# Patient Record
Sex: FEMALE | Race: WHITE | NOT HISPANIC OR LATINO | Employment: OTHER | URBAN - METROPOLITAN AREA
[De-identification: names, ages, dates, MRNs, and addresses within clinical notes are randomized per-mention and may not be internally consistent; named-entity substitution may affect disease eponyms.]

---

## 2022-09-26 ENCOUNTER — TRANSFERRED RECORDS (OUTPATIENT)
Dept: HEALTH INFORMATION MANAGEMENT | Facility: CLINIC | Age: 82
End: 2022-09-26

## 2022-09-26 ENCOUNTER — MEDICAL CORRESPONDENCE (OUTPATIENT)
Dept: HEALTH INFORMATION MANAGEMENT | Facility: CLINIC | Age: 82
End: 2022-09-26

## 2022-09-27 ENCOUNTER — TRANSCRIBE ORDERS (OUTPATIENT)
Dept: OTHER | Age: 82
End: 2022-09-27

## 2022-09-27 DIAGNOSIS — C50.911 MALIGNANT NEOPLASM OF RIGHT BREAST (H): Primary | ICD-10-CM

## 2022-10-20 NOTE — PROGRESS NOTES
MEDICAL RECORDS REQUEST   Radiation Oncology  [address] [place] [address]  PHONE: [phone]  Fax: [phone]        FUTURE VISIT INFORMATION                                                   Jayshree M Michael, : 1940 scheduled for future visit at [facility] Radiation Oncology    APPOINTMENT INFORMATION:    Date: 10/25/2022    Provider:  Dr. Clark     Reason for Visit/Diagnosis: Right breast cancer    REFERRAL INFORMATION:    Referring provider:  Kimberlee Hoang MD    RECORDS REQUESTED FOR VISIT                                                     Action    Action Taken 10/20/2022 3:20pm KEB   I called [facility] Dept PH: [phone] - their phone line is busy.. will call again later.     I called pt Jayshree     10/24/2022 3:03pm KEMASOUD   I called [facility] Dept - they will push ALL breast imaging on pt to [place] P[facility]    I called our internal file room to have breast imaging resolved [phone]    10/25/2022 8:13AM KEB   I called our internal file room again- none of the breast imaging has been resolved yet PH: [phone]- [place] will help resolve the images. He is aware of the appt this afternoon.      SOFT TISSUE & BREAST     CT, MRI, PET/CT AND REPORTS in process- Requested IMG ([facility])    ANY RECENT LABS yes   SURGICAL REPORTS yes     2022 [facility] Biopsy Report in Epic/CE  A) RIGHT BREAST, MASTECTOMY:   1. Invasive ductal carcinoma    2022   A) RIGHT BREAST, 10:00, 5 CM FROM NIPPLE, STEREOTACTIC-GUIDED CORE BIOPSY:   1. Invasive ductal carcinoma    PATHOLOGY REPORTS yes   *Send all radiation Prior radiation records for both [facility] and [facility] Radiation Oncology patients to [facility] with  ATTN: PATRICK/Yung Dosi/Physics

## 2022-10-25 ENCOUNTER — OFFICE VISIT (OUTPATIENT)
Dept: RADIATION ONCOLOGY | Facility: CLINIC | Age: 82
End: 2022-10-25
Attending: SURGERY
Payer: MEDICARE

## 2022-10-25 ENCOUNTER — PRE VISIT (OUTPATIENT)
Dept: RADIATION ONCOLOGY | Facility: CLINIC | Age: 82
End: 2022-10-25

## 2022-10-25 VITALS
SYSTOLIC BLOOD PRESSURE: 99 MMHG | RESPIRATION RATE: 16 BRPM | OXYGEN SATURATION: 98 % | DIASTOLIC BLOOD PRESSURE: 64 MMHG | WEIGHT: 121.5 LBS | HEART RATE: 91 BPM

## 2022-10-25 DIAGNOSIS — Z17.0 MALIGNANT NEOPLASM OF UPPER-OUTER QUADRANT OF RIGHT BREAST IN FEMALE, ESTROGEN RECEPTOR POSITIVE (H): Primary | ICD-10-CM

## 2022-10-25 DIAGNOSIS — C50.411 MALIGNANT NEOPLASM OF UPPER-OUTER QUADRANT OF RIGHT BREAST IN FEMALE, ESTROGEN RECEPTOR POSITIVE (H): Primary | ICD-10-CM

## 2022-10-25 PROBLEM — E78.5 HYPERLIPIDEMIA: Status: ACTIVE | Noted: 2017-06-29

## 2022-10-25 PROBLEM — C44.91 BASAL CELL CARCINOMA (BCC): Status: ACTIVE | Noted: 2018-06-01

## 2022-10-25 PROBLEM — Z71.89 ADVANCE CARE PLANNING: Status: ACTIVE | Noted: 2022-06-07

## 2022-10-25 PROBLEM — G89.4 CHRONIC PAIN DISORDER: Status: ACTIVE | Noted: 2019-10-14

## 2022-10-25 PROBLEM — C50.919 BREAST CANCER (H): Status: ACTIVE | Noted: 2022-10-25

## 2022-10-25 PROBLEM — R41.89 IMPAIRED COGNITION: Status: ACTIVE | Noted: 2022-06-07

## 2022-10-25 PROBLEM — M51.369 DDD (DEGENERATIVE DISC DISEASE), LUMBAR: Status: ACTIVE | Noted: 2019-10-14

## 2022-10-25 PROBLEM — M54.17 LUMBOSACRAL RADICULITIS: Status: ACTIVE | Noted: 2022-10-25

## 2022-10-25 PROCEDURE — 99205 OFFICE O/P NEW HI 60 MIN: CPT | Performed by: RADIOLOGY

## 2022-10-25 RX ORDER — FLUTICASONE PROPIONATE 50 MCG
1 SPRAY, SUSPENSION (ML) NASAL DAILY
COMMUNITY

## 2022-10-25 RX ORDER — GABAPENTIN 300 MG/1
300 CAPSULE ORAL AT BEDTIME
COMMUNITY
Start: 2022-10-04

## 2022-10-25 RX ORDER — LETROZOLE 2.5 MG/1
TABLET, FILM COATED ORAL
COMMUNITY
Start: 2022-10-17

## 2022-10-25 RX ORDER — MELOXICAM 15 MG/1
TABLET ORAL PRN
COMMUNITY
Start: 2022-10-04 | End: 2022-11-09

## 2022-10-25 RX ORDER — ROSUVASTATIN CALCIUM 5 MG/1
TABLET, COATED ORAL
COMMUNITY
Start: 2021-09-15

## 2022-10-25 NOTE — PROGRESS NOTES
Dear Colleagues,  Today Jayshree Rodriguez was seen in consultation.  IDENTIFICATION: This is a 82 year old woman with right (UOQ) breast cancer, status post mastectomy and SLNBx, revealing G2 IDCA w/ G3 DCIS, [id number] ER+/MO+/H2N- disease referred for adjuvant radiation therapy. She will not be receiving chemotherapy.  HISTORY OF PRESENT ILLNESS: Jayshree Rodriguez was in her usual state of health this past year.   On August 1, 2022 bilateral screening mammogram showed within the right upper outer breast a small mass.  Negative findings in the left breast.  On August 11, 2022 right diagnostic mammogram showed the right breast asymmetry with no sonographic correlate and an intramammary lymph node at the 10:00 position.  Stereotactic biopsy was completed on August 19, 2022 showing grade 2 IDC with grade 2 DCIS, ER/MO positive HER2/yolie negative..  On August 31, 2022 breast MRI showed the right upper outer breast mass measuring 2.7 cm in greatest dimension with associated enhancement from the mass to the base of the nipple.  A hematoma was also present.  The left breast mass was unremarkable.  There was also a 1.5 cm lymph node in the right axilla with a thickened cortex.  No morphologically abnormal lymph nodes on the left.  On September 21, 2021, Dr. Kimberlee Hoang took her to the OR for right breast mastectomy and sentinel lymph node biopsy.  Pathology found 5.5 cm of grade 2 IDC with involvement of the nipple dermis.  There was positive LVSI and positive grade 3 DCIS.  There was an involved intramammary lymph node that measured 6 mm. There was no VANESSA identified in this node.  There was also 1 out of 3 involved right axillary lymph nodes removed and VANESSA was present.  Negative invasive margins and adequate noninvasive margins.  This gave her a pathologic stage of [id number], ER/MO positive HER2/yolie negative. Her postoperative course has been unenventful.  She was subsequently seen by Dr. Avendano. The benefit of treatment did not  outweigh the risks and no systemic chemotherapy is recommended.  She is currently on Letrozole/endocrine therapy.  Most recently she presented with chronic left-sided low back pain with left-sided sciatica.  Spinal MRI completed on 2022 did not show evidence of malignancy.    Since her surgery, she has continued to heal well and denies any significant pain.  She has good ROM.  She denies any other new masses, skin changes, shortness of breath, chest or bony pain, or new neurologic symptoms. She is being seen here today for consideration of postoperative radiotherapy.  REVIEW OF SYSTEMS: As per HPI, a 14-point review of system is otherwise negative.  PAST RADIATION THERAPY:  Denies.  PAST CTD/PACEMAKER: Denies  BREAST RISK FACTORS:  Prior right breast biopsy in  benign per pt. Paternal aunt, grandmother and great grandmother had breast cancer. No family history of ovarian cancer. She started her menses at age 13.   with her first delivery at age 26. She did not breastfeed.  Menopause in her 50's. HRT x 20 years. OCP use x 6 years. IUD for 3 years.      Past Medical History:   Diagnosis Date     Advance care planning 2022     Basal cell carcinoma (BCC) 2018    Formatting of this note might be different from the original. Ears, face     Breast cancer (H) 10/25/2022     DDD (degenerative disc disease), lumbar 10/14/2019     Hip bursitis, left 2013     Hyperlipidemia 2017     Impaired cognition 2022     Lumbosacral radiculitis 10/25/2022     Malignant neoplasm of upper-outer quadrant of right breast in female, estrogen receptor positive (H) 2022     Rosacea 2013     Tinnitus 2013     Ulcerative colitis (H) 2013    Formatting of this note might be different from the original. Follows with gastroenterology at [place].  Also sees Dr. Storm     Vitreous detachment 2013       Past Surgical History:   Procedure Laterality Date     CATARACT IOL, RT/LT Bilateral       CHOLECYSTECTOMY  1998     hemilaminotomy      Left L4-5 and L 3-4 hemilaminotomy and decompression, microdissection     LUMBAR LAMINECTOMY       TUBAL LIGATION      age 33       Family History   Problem Relation Age of Onset     Breast Cancer Paternal Grandmother 60     Breast Cancer Paternal Aunt 50     Breast Cancer Paternal Great-Grandmother        Social History     Tobacco Use     Smoking status: Former     Years: 5.00     Types: Cigarettes     Quit date: 1965     Years since quittin.9     Smokeless tobacco: Never   Substance Use Topics     Alcohol use: Yes     Alcohol/week: 2.0 standard drinks     Types: 2 Glasses of wine per week     Current Outpatient Medications   Medication     Calcium Carb-Cholecalciferol (CALCIUM/VITAMIN D PO)     fluticasone (FLONASE) 50 MCG/ACT nasal spray     gabapentin (NEURONTIN) 300 MG capsule     letrozole (FEMARA) 2.5 MG tablet     polyethylene glycol-propylene glycol (SYSTANE ULTRA) 0.4-0.3 % SOLN ophthalmic solution     psyllium (METAMUCIL/KONSYL) 0.52 g capsule     rosuvastatin (CRESTOR) 5 MG tablet     meloxicam (MOBIC) 15 MG tablet     No current facility-administered medications for this visit.        Allergies   Allergen Reactions     Amoxicillin Diarrhea and GI Disturbance     ##No similarities in side chains, very low to no risk of cross-sensitivity to ANCEF. [facility] Antimicrobial Stewardship Team 10/2019       PHYSICAL EXAMINATION:  BP 99/64   Pulse 91   Resp 16   Wt 55.1 kg (121 lb 8 oz)   SpO2 98%   GENERAL Well-appearing elderly woman in no acute distress.  HEENT Normocephalic, atraumatic.  Sclerae anicteric.  CVR  Regular rate and rhythm.  No murmurs, rubs, or gallops.  LUNGS Clear to auscultation bilaterally.  BREASTS Breasts are examined in the supine and upright position.  The right breast is absent.  The left breast is unremarkable, as there is no erythema, ulceration or suspicious nodularity within it.  There is no erythema, retraction,  desquamation or discharge appreciated within the left nipple areolar complex.    CHEST  Right chest wall/mastectomy scar is well healed with no signs of recurrence.    LYMPH No supraclavicular, infraclavicular, or axillary lymphadenopathy appreciated bilaterally.  ABDOMEN Soft.    EXT  No clubbing or cyanosis or edema.    NEURO No focal deficits.  MSK  Fair ROM.   SKIN  Warm and well perfused.    PSYCH  Alert and oriented x 3    ECOG PERFORMANCE STATUS: 0    IMPRESSION/PLAN: Jayshree Rodriguez is a 82 year old woman with right (UOQ) breast cancer, status post mastectomy and SLNBx, revealing G2 IDCA w/ G3 DCIS, [id number] ER+/IL+/H2N- disease referred for adjuvant radiation therapy. She will not be receiving chemotherapy. I recommend adjuvant XRT to the right chest wall and regional nodes to improve local control and overall survival.    The risks, benefits, treatment rationale and regimen of radiation therapy to the right chest wall and regional nodes were discussed in great detail today with the patient.  Risks include but are not limited to skin erythema, desquamation, hyperpigmentation, chest wall and arm lymphedema, fibrosis, telengectasia, pneumonitis, cardiac toxicity, rib fracture and secondary malignancy. The patient consented to therapy.  She was asked to complete daily range of motion exercises to improve her mobility over the next 1 week.  Afterwards she will return for CT simulation and radiation treatment will start ~1 week afterwards.    Additional problem list to be addressed in the following manner:  1. Systemic/hormonal treatment : No systemic chemo recommended.  Currently on Letrozole/adjuvant endocrine therapy.   2. Scheduled with lymphedema on 11/30/22.  There was ample time for questions and all were answered to the patient's satisfaction. Thank you for allowing me to participate in the care of this pleasant patient. If you have any questions, please do not hesitate to contact my  office.    Sincerely,  Ruddy Clark MD

## 2022-10-25 NOTE — NURSING NOTE
REASON FOR APPOINTMENT   Type of Cancer: R breast IDC grade 2 ER/FL+ HER2-  Location: R breast  Date of Symptom Onset: 22 Bilateral screening mammogram    TREATMENT TO-DATE FOR THIS CANCER  Surgery ? 22 R breast mastectomy with R SLN bx Dr. Kimberlee Haong   Chemotherapy ? no   Other Treatments for this Cancer ? Patient started Letrozole on 10/18/2022    PERSONAL HISTORY OF CANCER   Previous Cancer ? no   Prior Radiation ? no   Prior Chemotherapy ? no   Prior Hormonal Therapy ? no     REFERRALS NEEDED  no    VITALS  BP 99/64   Pulse 91   Resp 16   Wt 55.1 kg (121 lb 8 oz)   SpO2 98%     PACEMAKER/IMPLANTED CARDIAC DEVICE no    PAIN  Denies    PSYCHOSOCIAL  Marital Status:   Patient lives in [facility]  Number of children: 2  Working status: Retired  Do you feel safe in your home? Yes    REVIEW OF SYSTEMS  Skin: negative  Eyes: positive for glasses  Ears/Nose/Throat: positive for hearing loss, tinnitus  Respiratory: No shortness of breath, dyspnea on exertion, cough, or hemoptysis  Cardiovascular: negative  Gastrointestinal: negative  Genitourinary: positive for incontinence  Musculoskeletal: positive for back pain and joint pain  Neurologic: positive for numbness or tingling of feet  Psychiatric: negative  Hematologic/Lymphatic/Immunologic: negative  Endocrine: negative    WOMEN ONLY  Any chance you may be pregnant: No  Age at first period: 13  Date of last period: post menopausal age 50's  Number of pregnancies: 2  Live Births: 2  Age at 1st delivery: 26  Breastfeeding: no  Birth Control pills: Yes  If yes, for how lon years  HRT: 20 years, off since      Radiation Oncology Patient Teaching    Current Concern: R breast cancer    Person involved with teaching: Patient  Patient asked Questions: Yes  Patient was cooperative: Yes  Patient was receptive (willing to accept information given): Yes    Education Assessment  Comprehension ability: High  Knowledge level: Medium  Factors  affecting teaching: None    Education Materials Given  Caring for Your Skin During Radiation, Aquaphor or Vanicream, Fatigue, ROM exercises    Educational Topics Discussed  Side effects- see above, ROM exercises    Response To Teaching  Verbalizes understanding    Do you have an advanced directive or living will? yes  Are you DNR/DNI? no    Gege Calvin RN

## 2022-11-01 ENCOUNTER — DOCUMENTATION ONLY (OUTPATIENT)
Dept: OTHER | Facility: CLINIC | Age: 82
End: 2022-11-01

## 2022-11-01 ENCOUNTER — OFFICE VISIT (OUTPATIENT)
Dept: RADIATION ONCOLOGY | Facility: CLINIC | Age: 82
End: 2022-11-01
Payer: MEDICARE

## 2022-11-01 DIAGNOSIS — Z17.0 MALIGNANT NEOPLASM OF UPPER-OUTER QUADRANT OF RIGHT BREAST IN FEMALE, ESTROGEN RECEPTOR POSITIVE (H): Primary | ICD-10-CM

## 2022-11-01 DIAGNOSIS — C50.411 MALIGNANT NEOPLASM OF UPPER-OUTER QUADRANT OF RIGHT BREAST IN FEMALE, ESTROGEN RECEPTOR POSITIVE (H): Primary | ICD-10-CM

## 2022-11-01 PROBLEM — Z71.89 ADVANCE CARE PLANNING: Status: RESOLVED | Noted: 2022-06-07 | Resolved: 2022-11-01

## 2022-11-01 PROCEDURE — 99207 PR NO BILLABLE SERVICE THIS VISIT: CPT | Performed by: RADIOLOGY

## 2022-11-01 PROCEDURE — 77334 RADIATION TREATMENT AID(S): CPT | Performed by: RADIOLOGY

## 2022-11-01 NOTE — LETTER
11/1/2022         RE: Jayshree Rodriguez  [address]  [facility] [place] [address]        Dear Colleague,    Thank you for referring your patient, Jayshree Rodriguez, to the [facility] RADIATION ONCOLOGY [place]. Please see a copy of my visit note below.    CT sim completed    AS      Again, thank you for allowing me to participate in the care of your patient.        Sincerely,        JANINE Clark MD

## 2022-11-04 ENCOUNTER — APPOINTMENT (OUTPATIENT)
Dept: RADIATION ONCOLOGY | Facility: CLINIC | Age: 82
End: 2022-11-04
Payer: MEDICARE

## 2022-11-04 PROCEDURE — 77300 RADIATION THERAPY DOSE PLAN: CPT | Performed by: RADIOLOGY

## 2022-11-04 PROCEDURE — 77334 RADIATION TREATMENT AID(S): CPT | Performed by: RADIOLOGY

## 2022-11-04 PROCEDURE — 77293 RESPIRATOR MOTION MGMT SIMUL: CPT | Performed by: RADIOLOGY

## 2022-11-04 PROCEDURE — 77295 3-D RADIOTHERAPY PLAN: CPT | Performed by: RADIOLOGY

## 2022-11-07 ENCOUNTER — APPOINTMENT (OUTPATIENT)
Dept: RADIATION ONCOLOGY | Facility: CLINIC | Age: 82
End: 2022-11-07
Payer: MEDICARE

## 2022-11-07 PROCEDURE — 77280 THER RAD SIMULAJ FIELD SMPL: CPT | Performed by: RADIOLOGY

## 2022-11-08 ENCOUNTER — APPOINTMENT (OUTPATIENT)
Dept: RADIATION ONCOLOGY | Facility: CLINIC | Age: 82
End: 2022-11-08
Payer: MEDICARE

## 2022-11-08 PROCEDURE — 77387 GUIDANCE FOR RADJ TX DLVR: CPT | Performed by: RADIOLOGY

## 2022-11-08 PROCEDURE — 77332 RADIATION TREATMENT AID(S): CPT | Performed by: RADIOLOGY

## 2022-11-08 PROCEDURE — 77331 SPECIAL RADIATION DOSIMETRY: CPT | Performed by: RADIOLOGY

## 2022-11-08 PROCEDURE — 77412 RADIATION TX DELIVERY LVL 3: CPT | Performed by: RADIOLOGY

## 2022-11-09 ENCOUNTER — OFFICE VISIT (OUTPATIENT)
Dept: RADIATION ONCOLOGY | Facility: CLINIC | Age: 82
End: 2022-11-09
Payer: MEDICARE

## 2022-11-09 ENCOUNTER — APPOINTMENT (OUTPATIENT)
Dept: RADIATION ONCOLOGY | Facility: CLINIC | Age: 82
End: 2022-11-09
Payer: MEDICARE

## 2022-11-09 VITALS
SYSTOLIC BLOOD PRESSURE: 117 MMHG | TEMPERATURE: 98 F | DIASTOLIC BLOOD PRESSURE: 65 MMHG | OXYGEN SATURATION: 98 % | WEIGHT: 120.3 LBS | HEART RATE: 87 BPM | RESPIRATION RATE: 16 BRPM

## 2022-11-09 DIAGNOSIS — Z17.0 MALIGNANT NEOPLASM OF UPPER-OUTER QUADRANT OF RIGHT BREAST IN FEMALE, ESTROGEN RECEPTOR POSITIVE (H): Primary | ICD-10-CM

## 2022-11-09 DIAGNOSIS — C50.411 MALIGNANT NEOPLASM OF UPPER-OUTER QUADRANT OF RIGHT BREAST IN FEMALE, ESTROGEN RECEPTOR POSITIVE (H): Primary | ICD-10-CM

## 2022-11-09 PROCEDURE — 77412 RADIATION TX DELIVERY LVL 3: CPT | Performed by: RADIOLOGY

## 2022-11-09 PROCEDURE — 99207 PR DROP WITH A PROCEDURE: CPT | Performed by: RADIOLOGY

## 2022-11-09 PROCEDURE — 77387 GUIDANCE FOR RADJ TX DLVR: CPT | Performed by: RADIOLOGY

## 2022-11-09 ASSESSMENT — PAIN SCALES - GENERAL: PAINLEVEL: NO PAIN (0)

## 2022-11-09 NOTE — LETTER
2022         RE: Jayshree Rodriguez  [address]  [facility] [address]        Dear Colleague,    Thank you for referring your patient, Jayshree Rodriguez, to the [facility] RADIATION ONCOLOGY [place]. Please see a copy of my visit note below.    [facility] PHYSICIANS  SPECIALIZING IN BREAKTHROUGHS  Radiation Oncology    On Treatment Visit Note      Jayshree Rodriguez      Date: 2022   MRN: [id number]   : 1940  Diagnosis: R breast IDC ER/NE+ HER2-      Reason for Visit:  On Radiation Treatment Visit     Treatment Summary to Date  Treatment Site: R CW + bst, R SC Current Dose: 532/5256, 532/4256 cGy Fractions: ,       Chemotherapy  Chemo concurrent with radx?: No    Subjective:     Early in treatment doing well with no complaints    Nursing ROS:   Nutrition Alteration  Diet Type: Patient's Preference  Skin  Skin Reaction: 0 - No changes  Skin Intervention: Vanicream BID  Skin Note: Patient has fluid to outer R breast since surgery        Cardiovascular  Respiratory effort: 1 - Normal - without distress        Psychosocial  Psychosocial Note: Patient denies fatigue  Pain Assessment  0-10 Pain Scale: 0  Pain Note: Patient reports some burning since surgery to R CW and under R arm      Objective:   /65   Pulse 87   Temp 98  F (36.7  C) (Oral)   Resp 16   Wt 54.6 kg (120 lb 4.8 oz)   SpO2 98%   Gen: Appears well, in no acute distress  Skin: No erythema    Labs:  CBC RESULTS: No results for input(s): WBC, RBC, HGB, HCT, MCV, MCH, MCHC, RDW, PLT in the last 85276 hours.  ELECTROLYTES:  No results for input(s): NA, POTASSIUM, CHLORIDE, RAMAN, CO2, BUN, CR, GLC in the last 23379 hours.    Assessment:    Tolerating radiation therapy well.  All questions and concerns addressed.    Plan:   1. Continue current therapy.    2. Skin care as previously directed      Mosaiq chart and setup information reviewed  Ports checked    Medication Review  Med list reviewed with  patient?: Yes    Educational Topic Discussed  Education Instructions: Skin cares, fatigue, ROM exercises        Ruddy Clark MD    Please do not send letter to referring physician.          Again, thank you for allowing me to participate in the care of your patient.        Sincerely,        JANINE Clark MD

## 2022-11-10 ENCOUNTER — APPOINTMENT (OUTPATIENT)
Dept: RADIATION ONCOLOGY | Facility: CLINIC | Age: 82
End: 2022-11-10
Payer: MEDICARE

## 2022-11-10 PROCEDURE — 77387 GUIDANCE FOR RADJ TX DLVR: CPT | Performed by: SURGERY

## 2022-11-10 PROCEDURE — 77412 RADIATION TX DELIVERY LVL 3: CPT | Performed by: SURGERY

## 2022-11-10 NOTE — PROGRESS NOTES
[facility] PHYSICIANS  SPECIALIZING IN BREAKTHROUGHS  Radiation Oncology    On Treatment Visit Note      Jayshree Rodriguez      Date: 2022   MRN: [id number]   : 1940  Diagnosis: R breast IDC ER/[place]+ HER2-      Reason for Visit:  On Radiation Treatment Visit     Treatment Summary to Date  Treatment Site: R CW + bst, R SC Current Dose: 532/5256, 532/4256 cGy Fractions: ,       Chemotherapy  Chemo concurrent with radx?: No    Subjective:     Early in treatment doing well with no complaints    Nursing ROS:   Nutrition Alteration  Diet Type: Patient's Preference  Skin  Skin Reaction: 0 - No changes  Skin Intervention: Vanicream BID  Skin Note: Patient has fluid to outer R breast since surgery        Cardiovascular  Respiratory effort: 1 - Normal - without distress        Psychosocial  Psychosocial Note: Patient denies fatigue  Pain Assessment  0-10 Pain Scale: 0  Pain Note: Patient reports some burning since surgery to R CW and under R arm      Objective:   /65   Pulse 87   Temp 98  F (36.7  C) (Oral)   Resp 16   Wt 54.6 kg (120 lb 4.8 oz)   SpO2 98%   Gen: Appears well, in no acute distress  Skin: No erythema    Labs:  CBC RESULTS: No results for input(s): WBC, RBC, HGB, HCT, MCV, MCH, MCHC, RDW, PLT in the last 91636 hours.  ELECTROLYTES:  No results for input(s): NA, POTASSIUM, CHLORIDE, RAMAN, CO2, BUN, CR, GLC in the last 84296 hours.    Assessment:    Tolerating radiation therapy well.  All questions and concerns addressed.    Plan:   1. Continue current therapy.    2. Skin care as previously directed      Mosaiq chart and setup information reviewed  Ports checked    Medication Review  Med list reviewed with patient?: Yes    Educational Topic Discussed  Education Instructions: Skin cares, fatigue, ROM exercises        Ruddy Clark MD    Please do not send letter to referring physician.

## 2022-11-11 ENCOUNTER — APPOINTMENT (OUTPATIENT)
Dept: RADIATION ONCOLOGY | Facility: CLINIC | Age: 82
End: 2022-11-11
Payer: MEDICARE

## 2022-11-11 PROCEDURE — 77412 RADIATION TX DELIVERY LVL 3: CPT | Performed by: SURGERY

## 2022-11-11 PROCEDURE — 77387 GUIDANCE FOR RADJ TX DLVR: CPT | Performed by: SURGERY

## 2022-11-14 ENCOUNTER — APPOINTMENT (OUTPATIENT)
Dept: RADIATION ONCOLOGY | Facility: CLINIC | Age: 82
End: 2022-11-14
Payer: MEDICARE

## 2022-11-14 PROCEDURE — 77387 GUIDANCE FOR RADJ TX DLVR: CPT | Performed by: RADIOLOGY

## 2022-11-14 PROCEDURE — 77427 RADIATION TX MANAGEMENT X5: CPT | Performed by: RADIOLOGY

## 2022-11-14 PROCEDURE — 77336 RADIATION PHYSICS CONSULT: CPT | Performed by: RADIOLOGY

## 2022-11-14 PROCEDURE — 77412 RADIATION TX DELIVERY LVL 3: CPT | Performed by: RADIOLOGY

## 2022-11-15 ENCOUNTER — APPOINTMENT (OUTPATIENT)
Dept: RADIATION ONCOLOGY | Facility: CLINIC | Age: 82
End: 2022-11-15
Payer: MEDICARE

## 2022-11-15 PROCEDURE — 77387 GUIDANCE FOR RADJ TX DLVR: CPT | Performed by: RADIOLOGY

## 2022-11-15 PROCEDURE — 77412 RADIATION TX DELIVERY LVL 3: CPT | Performed by: RADIOLOGY

## 2022-11-16 ENCOUNTER — OFFICE VISIT (OUTPATIENT)
Dept: RADIATION ONCOLOGY | Facility: CLINIC | Age: 82
End: 2022-11-16
Payer: MEDICARE

## 2022-11-16 ENCOUNTER — APPOINTMENT (OUTPATIENT)
Dept: RADIATION ONCOLOGY | Facility: CLINIC | Age: 82
End: 2022-11-16
Payer: MEDICARE

## 2022-11-16 VITALS
SYSTOLIC BLOOD PRESSURE: 106 MMHG | TEMPERATURE: 97.4 F | HEART RATE: 85 BPM | OXYGEN SATURATION: 99 % | RESPIRATION RATE: 16 BRPM | WEIGHT: 127.8 LBS | DIASTOLIC BLOOD PRESSURE: 69 MMHG

## 2022-11-16 DIAGNOSIS — C50.411 MALIGNANT NEOPLASM OF UPPER-OUTER QUADRANT OF RIGHT BREAST IN FEMALE, ESTROGEN RECEPTOR POSITIVE (H): Primary | ICD-10-CM

## 2022-11-16 DIAGNOSIS — Z17.0 MALIGNANT NEOPLASM OF UPPER-OUTER QUADRANT OF RIGHT BREAST IN FEMALE, ESTROGEN RECEPTOR POSITIVE (H): Primary | ICD-10-CM

## 2022-11-16 PROCEDURE — 77412 RADIATION TX DELIVERY LVL 3: CPT | Performed by: RADIOLOGY

## 2022-11-16 PROCEDURE — 99207 PR DROP WITH A PROCEDURE: CPT | Performed by: RADIOLOGY

## 2022-11-16 ASSESSMENT — PAIN SCALES - GENERAL: PAINLEVEL: NO PAIN (0)

## 2022-11-16 NOTE — LETTER
2022         RE: Jayshree Rodriguez  [address]  [facility] [address]        Dear Colleague,    Thank you for referring your patient, Jayshree Rodriguez, to the [facility] RADIATION ONCOLOGY [place]. Please see a copy of my visit note below.    [facility] PHYSICIANS  SPECIALIZING IN BREAKTHROUGHS  Radiation Oncology    On Treatment Visit Note      Jayshree Rodriguez      Date: 2022   MRN: [id number]   : 1940  Diagnosis: R breast IDC ER/[place]+ HER2-      Reason for Visit:  On Radiation Treatment Visit     Treatment Summary to Date  Treatment Site: R CW + bst, R SC Current Dose: 1862/5256, 1862/4256 cGy Fractions: ,       Chemotherapy  Chemo concurrent with radx?: No    Subjective:     Doing well with treatment with subtle skin changes.      Nursing ROS:   Nutrition Alteration  Diet Type: Patient's Preference  Skin  Skin Reaction: 1 - Faint erythema or dry desquamation (no desquamation)  Skin Intervention: Patient to start Hydrocortisone 1% cream TID and patient swtiched to Aquaphor BID because Vanicream was not absorbing.  Skin Note: Patient has fluid to outer R breast since surgery        Cardiovascular  Respiratory effort: 1 - Normal - without distress        Psychosocial  Psychosocial Note: Patient denies fatigue  Pain Assessment  0-10 Pain Scale: 0  Pain Note: Patient reports some burning since surgery to R CW and under R arm      Objective:   /69   Pulse 85   Temp 97.4  F (36.3  C) (Oral)   Resp 16   Wt 58 kg (127 lb 12.8 oz)   SpO2 99%   Gen: Appears well, in no acute distress  Skin: Minimal diffuse erythema over treatment field    Labs:  CBC RESULTS: No results for input(s): WBC, RBC, HGB, HCT, MCV, MCH, MCHC, RDW, PLT in the last 97382 hours.  ELECTROLYTES:  No results for input(s): NA, POTASSIUM, CHLORIDE, RAMAN, CO2, BUN, CR, GLC in the last 66193 hours.    Assessment:    Tolerating radiation therapy well.  All questions and concerns  addressed.    Toxicities:  Fatigue: Grade 0: No toxicity  Pain: Grade 0: No toxicity  Dermatitis: Grade 1: Faint erythema or dry desquamation    Plan:   1. Continue current therapy.    2. Skin care per above.      Mosaiq chart and setup information reviewed  Ports checked    Medication Review  Med list reviewed with patient?: Yes    Educational Topic Discussed  Education Instructions: Skin cares, fatigue, ROM exercises        Ruddy Clark MD    Please do not send letter to referring physician.      Machine went down towards the end of her treatment so we will add on an extra day of treatment to the endof her schedule.      Again, thank you for allowing me to participate in the care of your patient.        Sincerely,        JANINE Clark MD

## 2022-11-16 NOTE — PROGRESS NOTES
[facility] PHYSICIANS  SPECIALIZING IN BREAKTHROUGHS  Radiation Oncology    On Treatment Visit Note      Jayshree Rodriguez      Date: 2022   MRN: [id number]   : 1940  Diagnosis: R breast IDC ER/[place]+ HER2-      Reason for Visit:  On Radiation Treatment Visit     Treatment Summary to Date  Treatment Site: R CW + bst, R SC Current Dose: 1862/5256, 1862/4256 cGy Fractions: ,       Chemotherapy  Chemo concurrent with radx?: No    Subjective:     Doing well with treatment with subtle skin changes.      Nursing ROS:   Nutrition Alteration  Diet Type: Patient's Preference  Skin  Skin Reaction: 1 - Faint erythema or dry desquamation (no desquamation)  Skin Intervention: Patient to start Hydrocortisone 1% cream TID and patient swtiched to Aquaphor BID because Vanicream was not absorbing.  Skin Note: Patient has fluid to outer R breast since surgery        Cardiovascular  Respiratory effort: 1 - Normal - without distress        Psychosocial  Psychosocial Note: Patient denies fatigue  Pain Assessment  0-10 Pain Scale: 0  Pain Note: Patient reports some burning since surgery to R CW and under R arm      Objective:   /69   Pulse 85   Temp 97.4  F (36.3  C) (Oral)   Resp 16   Wt 58 kg (127 lb 12.8 oz)   SpO2 99%   Gen: Appears well, in no acute distress  Skin: Minimal diffuse erythema over treatment field    Labs:  CBC RESULTS: No results for input(s): WBC, RBC, HGB, HCT, MCV, MCH, MCHC, RDW, PLT in the last 99912 hours.  ELECTROLYTES:  No results for input(s): NA, POTASSIUM, CHLORIDE, RAMAN, CO2, BUN, CR, GLC in the last 87856 hours.    Assessment:    Tolerating radiation therapy well.  All questions and concerns addressed.    Toxicities:  Fatigue: Grade 0: No toxicity  Pain: Grade 0: No toxicity  Dermatitis: Grade 1: Faint erythema or dry desquamation    Plan:   1. Continue current therapy.    2. Skin care per above.      [email or web address] chart and setup information reviewed  Ports  checked    Medication Review  Med list reviewed with patient?: Yes    Educational Topic Discussed  Education Instructions: Skin cares, fatigue, ROM exercises        Ruddy Clark MD    Please do not send letter to referring physician.      Machine went down towards the end of her treatment so we will add on an extra day of treatment to the endof her schedule.

## 2022-11-18 ENCOUNTER — APPOINTMENT (OUTPATIENT)
Dept: RADIATION ONCOLOGY | Facility: CLINIC | Age: 82
End: 2022-11-18
Payer: MEDICARE

## 2022-11-18 PROCEDURE — 77412 RADIATION TX DELIVERY LVL 3: CPT | Performed by: SURGERY

## 2022-11-20 ENCOUNTER — HEALTH MAINTENANCE LETTER (OUTPATIENT)
Age: 82
End: 2022-11-20

## 2022-11-21 ENCOUNTER — APPOINTMENT (OUTPATIENT)
Dept: RADIATION ONCOLOGY | Facility: CLINIC | Age: 82
End: 2022-11-21
Payer: MEDICARE

## 2022-11-21 PROCEDURE — 77387 GUIDANCE FOR RADJ TX DLVR: CPT | Performed by: RADIOLOGY

## 2022-11-21 PROCEDURE — 77412 RADIATION TX DELIVERY LVL 3: CPT | Performed by: RADIOLOGY

## 2022-11-22 ENCOUNTER — APPOINTMENT (OUTPATIENT)
Dept: RADIATION ONCOLOGY | Facility: CLINIC | Age: 82
End: 2022-11-22
Payer: MEDICARE

## 2022-11-22 PROCEDURE — 77427 RADIATION TX MANAGEMENT X5: CPT | Performed by: RADIOLOGY

## 2022-11-22 PROCEDURE — 77336 RADIATION PHYSICS CONSULT: CPT | Performed by: RADIOLOGY

## 2022-11-22 PROCEDURE — 77387 GUIDANCE FOR RADJ TX DLVR: CPT | Performed by: RADIOLOGY

## 2022-11-22 PROCEDURE — 77412 RADIATION TX DELIVERY LVL 3: CPT | Performed by: RADIOLOGY

## 2022-11-23 ENCOUNTER — APPOINTMENT (OUTPATIENT)
Dept: RADIATION ONCOLOGY | Facility: CLINIC | Age: 82
End: 2022-11-23
Payer: MEDICARE

## 2022-11-23 PROCEDURE — 77412 RADIATION TX DELIVERY LVL 3: CPT | Performed by: SURGERY

## 2022-11-23 PROCEDURE — 77387 GUIDANCE FOR RADJ TX DLVR: CPT | Performed by: SURGERY

## 2022-11-25 ENCOUNTER — APPOINTMENT (OUTPATIENT)
Dept: RADIATION ONCOLOGY | Facility: CLINIC | Age: 82
End: 2022-11-25
Payer: MEDICARE

## 2022-11-25 PROCEDURE — 77387 GUIDANCE FOR RADJ TX DLVR: CPT | Performed by: STUDENT IN AN ORGANIZED HEALTH CARE EDUCATION/TRAINING PROGRAM

## 2022-11-25 PROCEDURE — 77412 RADIATION TX DELIVERY LVL 3: CPT | Performed by: STUDENT IN AN ORGANIZED HEALTH CARE EDUCATION/TRAINING PROGRAM

## 2022-11-28 ENCOUNTER — OFFICE VISIT (OUTPATIENT)
Dept: RADIATION ONCOLOGY | Facility: CLINIC | Age: 82
End: 2022-11-28
Payer: MEDICARE

## 2022-11-28 ENCOUNTER — APPOINTMENT (OUTPATIENT)
Dept: RADIATION ONCOLOGY | Facility: CLINIC | Age: 82
End: 2022-11-28
Payer: MEDICARE

## 2022-11-28 VITALS
TEMPERATURE: 97.8 F | SYSTOLIC BLOOD PRESSURE: 128 MMHG | WEIGHT: 124.12 LBS | DIASTOLIC BLOOD PRESSURE: 74 MMHG | HEART RATE: 82 BPM | RESPIRATION RATE: 16 BRPM | OXYGEN SATURATION: 97 %

## 2022-11-28 DIAGNOSIS — Z17.0 MALIGNANT NEOPLASM OF UPPER-OUTER QUADRANT OF RIGHT BREAST IN FEMALE, ESTROGEN RECEPTOR POSITIVE (H): Primary | ICD-10-CM

## 2022-11-28 DIAGNOSIS — C50.411 MALIGNANT NEOPLASM OF UPPER-OUTER QUADRANT OF RIGHT BREAST IN FEMALE, ESTROGEN RECEPTOR POSITIVE (H): Primary | ICD-10-CM

## 2022-11-28 DIAGNOSIS — L58.9 RADIATION DERMATITIS: ICD-10-CM

## 2022-11-28 PROCEDURE — 77334 RADIATION TREATMENT AID(S): CPT | Performed by: RADIOLOGY

## 2022-11-28 PROCEDURE — 77321 SPECIAL TELETX PORT PLAN: CPT | Performed by: RADIOLOGY

## 2022-11-28 PROCEDURE — 77412 RADIATION TX DELIVERY LVL 3: CPT | Performed by: RADIOLOGY

## 2022-11-28 PROCEDURE — 77387 GUIDANCE FOR RADJ TX DLVR: CPT | Performed by: RADIOLOGY

## 2022-11-28 PROCEDURE — 99207 PR DROP WITH A PROCEDURE: CPT | Performed by: RADIOLOGY

## 2022-11-28 RX ORDER — HYDROCORTISONE 25 MG/G
OINTMENT TOPICAL 3 TIMES DAILY
Qty: 30 G | Refills: 1 | Status: SHIPPED | OUTPATIENT
Start: 2022-11-28

## 2022-11-28 NOTE — PROGRESS NOTES
[facility] PHYSICIANS  SPECIALIZING IN BREAKTHROUGHS  Radiation Oncology    On Treatment Visit Note      Jayshree Rodriguez      Date: 2022   MRN: [id number]   : 1940  Diagnosis: R breast IDC ER/[place]+ HER2-      Reason for Visit:  On Radiation Treatment Visit     Treatment Summary to Date  Treatment Site: R CW + bst, R SC Current Dose: 3458/5256, 3390/4256 cGy Fractions: ,       Chemotherapy  Chemo concurrent with radx?: No    Subjective:     Having more skin irritation of the back and upper chest this week.    Nursing ROS:   Nutrition Alteration  Diet Type: Patient's Preference  Skin  Skin Reaction: 1 - Faint erythema or dry desquamation (no desquamation)  Skin Intervention: Patient to start Hydrocortisone 2.5% ointment 3 x day, Aquaphor 3-4 x day.  Skin Note: Patient has fluid to outer R breast since surgery, increased rash to chest and back, start Hydrocortisone 2.5%        Cardiovascular  Respiratory effort: 1 - Normal - without distress        Psychosocial  Psychosocial Note: Patient denies fatigue  Pain Assessment  0-10 Pain Scale: 0  Pain Note: Patient reports some burning since surgery to R CW and under R arm      Objective:   /74   Pulse 82   Temp 97.8  F (36.6  C) (Oral)   Resp 16   Wt 56.3 kg (124 lb 1.9 oz)   SpO2 97%   Gen: Appears well, in no acute distress  Skin: Mild diffuse erythema over treatment field    Labs:  CBC RESULTS: No results for input(s): WBC, RBC, HGB, HCT, MCV, MCH, MCHC, RDW, PLT in the last 47143 hours.  ELECTROLYTES:  No results for input(s): NA, POTASSIUM, CHLORIDE, RAMAN, CO2, BUN, CR, GLC in the last 87682 hours.    Assessment:    Tolerating radiation therapy well.  All questions and concerns addressed.    Toxicities:  Pain: Grade 1: Mild pain (itching)  Dermatitis: Grade 1: Faint erythema or dry desquamation    Plan:   1. Continue current therapy.    2. Will add hydrocortisone ointment 2.5% to skin regimen.  To be used twice daily.   Aquaphor 3-4 times per day      Mosaiq chart and setup information reviewed  Ports checked    Medication Review  Med list reviewed with patient?: Yes    Educational Topic Discussed  Education Instructions: Skin cares, fatigue, ROM exercises        Ruddy Clark MD    Please do not send letter to referring physician.

## 2022-11-28 NOTE — LETTER
2022         RE: Jayshree Rodriguez  [address]  [facility] [address]        Dear Colleague,    Thank you for referring your patient, Jayshree Rodriguez, to the [facility] [place]. Please see a copy of my visit note below.    [facility]  SPECIALIZING IN BREAKTHROUGHS  Radiation Oncology    On Treatment Visit Note      Jayshree Rodriguez      Date: 2022   MRN: [id number]   : 1940  Diagnosis: R breast IDC ER/LA+ HER2-      Reason for Visit:  On Radiation Treatment Visit     Treatment Summary to Date  Treatment Site: R CW + bst, R SC Current Dose: 3458/5256, 3390/4256 cGy Fractions: ,       Chemotherapy  Chemo concurrent with radx?: No    Subjective:     Having more skin irritation of the back and upper chest this week.    Nursing ROS:   Nutrition Alteration  Diet Type: Patient's Preference  Skin  Skin Reaction: 1 - Faint erythema or dry desquamation (no desquamation)  Skin Intervention: Patient to start Hydrocortisone 2.5% ointment 3 x day, Aquaphor 3-4 x day.  Skin Note: Patient has fluid to outer R breast since surgery, increased rash to chest and back, start Hydrocortisone 2.5%        Cardiovascular  Respiratory effort: 1 - Normal - without distress        Psychosocial  Psychosocial Note: Patient denies fatigue  Pain Assessment  0-10 Pain Scale: 0  Pain Note: Patient reports some burning since surgery to R CW and under R arm      Objective:   /74   Pulse 82   Temp 97.8  F (36.6  C) (Oral)   Resp 16   Wt 56.3 kg (124 lb 1.9 oz)   SpO2 97%   Gen: Appears well, in no acute distress  Skin: Mild diffuse erythema over treatment field    Labs:  CBC RESULTS: No results for input(s): WBC, RBC, HGB, HCT, MCV, MCH, MCHC, RDW, PLT in the last 89486 hours.  ELECTROLYTES:  No results for input(s): NA, POTASSIUM, CHLORIDE, RAMAN, CO2, BUN, CR, GLC in the last 07595 hours.    Assessment:    Tolerating radiation therapy well.  All  questions and concerns addressed.    Toxicities:  Pain: Grade 1: Mild pain (itching)  Dermatitis: Grade 1: Faint erythema or dry desquamation    Plan:   1. Continue current therapy.    2. Will add hydrocortisone ointment 2.5% to skin regimen.  To be used twice daily.  Aquaphor 3-4 times per day      Mosaiq chart and setup information reviewed  Ports checked    Medication Review  Med list reviewed with patient?: Yes    Educational Topic Discussed  Education Instructions: Skin cares, fatigue, ROM exercises        Ruddy Clark MD    Please do not send letter to referring physician.          Again, thank you for allowing me to participate in the care of your patient.        Sincerely,        JANINE Clark MD

## 2022-11-29 ENCOUNTER — APPOINTMENT (OUTPATIENT)
Dept: RADIATION ONCOLOGY | Facility: CLINIC | Age: 82
End: 2022-11-29
Payer: MEDICARE

## 2022-11-29 PROCEDURE — 77387 GUIDANCE FOR RADJ TX DLVR: CPT | Performed by: RADIOLOGY

## 2022-11-29 PROCEDURE — 77280 THER RAD SIMULAJ FIELD SMPL: CPT | Performed by: RADIOLOGY

## 2022-11-29 PROCEDURE — 77412 RADIATION TX DELIVERY LVL 3: CPT | Performed by: RADIOLOGY

## 2022-11-30 ENCOUNTER — OFFICE VISIT (OUTPATIENT)
Dept: RADIATION ONCOLOGY | Facility: CLINIC | Age: 82
End: 2022-11-30
Payer: MEDICARE

## 2022-11-30 ENCOUNTER — APPOINTMENT (OUTPATIENT)
Dept: RADIATION ONCOLOGY | Facility: CLINIC | Age: 82
End: 2022-11-30
Payer: MEDICARE

## 2022-11-30 VITALS
TEMPERATURE: 98.2 F | WEIGHT: 122.5 LBS | DIASTOLIC BLOOD PRESSURE: 68 MMHG | HEART RATE: 80 BPM | OXYGEN SATURATION: 99 % | RESPIRATION RATE: 16 BRPM | SYSTOLIC BLOOD PRESSURE: 102 MMHG

## 2022-11-30 DIAGNOSIS — L58.9 RADIATION DERMATITIS: ICD-10-CM

## 2022-11-30 DIAGNOSIS — C50.411 MALIGNANT NEOPLASM OF UPPER-OUTER QUADRANT OF RIGHT BREAST IN FEMALE, ESTROGEN RECEPTOR POSITIVE (H): Primary | ICD-10-CM

## 2022-11-30 DIAGNOSIS — Z17.0 MALIGNANT NEOPLASM OF UPPER-OUTER QUADRANT OF RIGHT BREAST IN FEMALE, ESTROGEN RECEPTOR POSITIVE (H): Primary | ICD-10-CM

## 2022-11-30 PROCEDURE — 77336 RADIATION PHYSICS CONSULT: CPT | Performed by: RADIOLOGY

## 2022-11-30 PROCEDURE — 77412 RADIATION TX DELIVERY LVL 3: CPT | Performed by: RADIOLOGY

## 2022-11-30 PROCEDURE — 77387 GUIDANCE FOR RADJ TX DLVR: CPT | Performed by: RADIOLOGY

## 2022-11-30 PROCEDURE — 99207 PR DROP WITH A PROCEDURE: CPT | Performed by: RADIOLOGY

## 2022-11-30 PROCEDURE — 77427 RADIATION TX MANAGEMENT X5: CPT | Performed by: RADIOLOGY

## 2022-11-30 ASSESSMENT — PAIN SCALES - GENERAL: PAINLEVEL: MILD PAIN (2)

## 2022-11-30 NOTE — LETTER
2022         RE: Jayshree Rodriguez  [address]  [facility] [address]        Dear Colleague,    Thank you for referring your patient, Jayshree Rodriguez, to the [facility] [place]. Please see a copy of my visit note below.    [facility]  SPECIALIZING IN BREAKTHROUGHS  Radiation Oncology    On Treatment Visit Note      Jayshree Rodriguez      Date: 2022   MRN: [id number]   : 1940  Diagnosis: R breast IDC ER/LA+ HER2-      Reason for Visit:  On Radiation Treatment Visit     Treatment Summary to Date  Treatment Site: R CW + bst, R SC Current Dose: 3990/5256, 3990/4256 cGy Fractions: 15/21, 15/16      Chemotherapy  Chemo concurrent with radx?: No    Subjective:     Doing well with expected side effects.  Using previously prescribed hydrocortisone and Aquaphor as directed    Nursing ROS:   Nutrition Alteration  Diet Type: Patient's Preference  Skin  Skin Reaction: 1 - Faint erythema or dry desquamation (no desquamation)  Skin Intervention: Hydrocortisone 2.5% ointment 3 x day, Aquaphor 3-4 x day.  Skin Note: Patient has fluid to outer R breast since surgery, increased rash to chest and back, itching has improved per patient since starting Hydrocortisone 2.5%        Cardiovascular  Respiratory effort: 1 - Normal - without distress        Psychosocial  Psychosocial Note: Patient denies fatigue  Pain Assessment  0-10 Pain Scale: 2  Pain Note: Patient reports occasional sharp shooting pains to chestwall.      Objective:   /68   Pulse 80   Temp 98.2  F (36.8  C) (Oral)   Resp 16   Wt 55.6 kg (122 lb 8 oz)   SpO2 99%   Gen: Appears well, in no acute distress  Skin: Mild diffuse erythema over treatment field    Labs:  CBC RESULTS: No results for input(s): WBC, RBC, HGB, HCT, MCV, MCH, MCHC, RDW, PLT in the last 32589 hours.  ELECTROLYTES:  No results for input(s): NA, POTASSIUM, CHLORIDE, RAMAN, CO2, BUN, CR, GLC in the last 95214  hours.    Assessment:    Tolerating radiation therapy well.  All questions and concerns addressed.    Toxicities:  Fatigue: Grade 0: No toxicity  Pain: Grade 1: Mild pain  Dermatitis: Grade 1: Faint erythema or dry desquamation    Plan:   1. Continue current therapy.    2. Skin care per above.      Mosaiq chart and setup information reviewed  Ports checked    Medication Review  Med list reviewed with patient?: Yes    Educational Topic Discussed  Education Instructions: Skin cares, fatigue, ROM exercises        Ruddy Clark MD    Please do not send letter to referring physician.          Again, thank you for allowing me to participate in the care of your patient.        Sincerely,        JANINE Clark MD

## 2022-11-30 NOTE — PROGRESS NOTES
[facility]  SPECIALIZING IN BREAKTHROUGHS  Radiation Oncology    On Treatment Visit Note      Jayshree Rodriguez      Date: 2022   MRN: [id number]   : 1940  Diagnosis: R breast IDC ER/[place]+ HER2-      Reason for Visit:  On Radiation Treatment Visit     Treatment Summary to Date  Treatment Site: R CW + bst, R SC Current Dose: 3990/5256, 3990/4256 cGy Fractions: 15/21, 15/16      Chemotherapy  Chemo concurrent with radx?: No    Subjective:     Doing well with expected side effects.  Using previously prescribed hydrocortisone and Aquaphor as directed    Nursing ROS:   Nutrition Alteration  Diet Type: Patient's Preference  Skin  Skin Reaction: 1 - Faint erythema or dry desquamation (no desquamation)  Skin Intervention: Hydrocortisone 2.5% ointment 3 x day, Aquaphor 3-4 x day.  Skin Note: Patient has fluid to outer R breast since surgery, increased rash to chest and back, itching has improved per patient since starting Hydrocortisone 2.5%        Cardiovascular  Respiratory effort: 1 - Normal - without distress        Psychosocial  Psychosocial Note: Patient denies fatigue  Pain Assessment  0-10 Pain Scale: 2  Pain Note: Patient reports occasional sharp shooting pains to chestwall.      Objective:   /68   Pulse 80   Temp 98.2  F (36.8  C) (Oral)   Resp 16   Wt 55.6 kg (122 lb 8 oz)   SpO2 99%   Gen: Appears well, in no acute distress  Skin: Mild diffuse erythema over treatment field    Labs:  CBC RESULTS: No results for input(s): WBC, RBC, HGB, HCT, MCV, MCH, MCHC, RDW, PLT in the last 11717 hours.  ELECTROLYTES:  No results for input(s): NA, POTASSIUM, CHLORIDE, RAMAN, CO2, BUN, CR, GLC in the last 87984 hours.    Assessment:    Tolerating radiation therapy well.  All questions and concerns addressed.    Toxicities:  Fatigue: Grade 0: No toxicity  Pain: Grade 1: Mild pain  Dermatitis: Grade 1: Faint erythema or dry desquamation    Plan:   1. Continue current therapy.    2. Skin  care per above.      Mosaiq chart and setup information reviewed  Ports checked    Medication Review  Med list reviewed with patient?: Yes    Educational Topic Discussed  Education Instructions: Skin cares, fatigue, ROM exercises        Ruddy Clark MD    Please do not send letter to referring physician.

## 2022-12-01 ENCOUNTER — APPOINTMENT (OUTPATIENT)
Dept: RADIATION ONCOLOGY | Facility: CLINIC | Age: 82
End: 2022-12-01
Payer: MEDICARE

## 2022-12-01 PROCEDURE — 77412 RADIATION TX DELIVERY LVL 3: CPT | Performed by: SURGERY

## 2022-12-01 PROCEDURE — 77387 GUIDANCE FOR RADJ TX DLVR: CPT | Performed by: SURGERY

## 2022-12-02 ENCOUNTER — APPOINTMENT (OUTPATIENT)
Dept: RADIATION ONCOLOGY | Facility: CLINIC | Age: 82
End: 2022-12-02
Payer: MEDICARE

## 2022-12-02 PROCEDURE — 77332 RADIATION TREATMENT AID(S): CPT | Performed by: RADIOLOGY

## 2022-12-02 PROCEDURE — 77412 RADIATION TX DELIVERY LVL 3: CPT | Performed by: SURGERY

## 2022-12-05 ENCOUNTER — APPOINTMENT (OUTPATIENT)
Dept: RADIATION ONCOLOGY | Facility: CLINIC | Age: 82
End: 2022-12-05
Payer: MEDICARE

## 2022-12-05 PROCEDURE — 77412 RADIATION TX DELIVERY LVL 3: CPT | Performed by: RADIOLOGY

## 2022-12-06 ENCOUNTER — APPOINTMENT (OUTPATIENT)
Dept: RADIATION ONCOLOGY | Facility: CLINIC | Age: 82
End: 2022-12-06
Payer: MEDICARE

## 2022-12-06 PROCEDURE — 77412 RADIATION TX DELIVERY LVL 3: CPT | Performed by: RADIOLOGY

## 2022-12-07 ENCOUNTER — APPOINTMENT (OUTPATIENT)
Dept: RADIATION ONCOLOGY | Facility: CLINIC | Age: 82
End: 2022-12-07
Payer: MEDICARE

## 2022-12-07 ENCOUNTER — OFFICE VISIT (OUTPATIENT)
Dept: RADIATION ONCOLOGY | Facility: CLINIC | Age: 82
End: 2022-12-07
Payer: MEDICARE

## 2022-12-07 VITALS
TEMPERATURE: 97.9 F | RESPIRATION RATE: 16 BRPM | HEART RATE: 86 BPM | WEIGHT: 121.5 LBS | DIASTOLIC BLOOD PRESSURE: 72 MMHG | OXYGEN SATURATION: 98 % | SYSTOLIC BLOOD PRESSURE: 107 MMHG

## 2022-12-07 DIAGNOSIS — Z17.0 MALIGNANT NEOPLASM OF UPPER-OUTER QUADRANT OF RIGHT BREAST IN FEMALE, ESTROGEN RECEPTOR POSITIVE (H): Primary | ICD-10-CM

## 2022-12-07 DIAGNOSIS — C50.411 MALIGNANT NEOPLASM OF UPPER-OUTER QUADRANT OF RIGHT BREAST IN FEMALE, ESTROGEN RECEPTOR POSITIVE (H): Primary | ICD-10-CM

## 2022-12-07 DIAGNOSIS — L58.9 RADIATION DERMATITIS: ICD-10-CM

## 2022-12-07 PROCEDURE — 77412 RADIATION TX DELIVERY LVL 3: CPT | Performed by: RADIOLOGY

## 2022-12-07 PROCEDURE — 77427 RADIATION TX MANAGEMENT X5: CPT | Performed by: RADIOLOGY

## 2022-12-07 PROCEDURE — 77336 RADIATION PHYSICS CONSULT: CPT | Performed by: RADIOLOGY

## 2022-12-07 PROCEDURE — 99207 PR DROP WITH A PROCEDURE: CPT | Performed by: RADIOLOGY

## 2022-12-07 RX ORDER — MOMETASONE FUROATE 1 MG/G
CREAM TOPICAL 2 TIMES DAILY
Qty: 50 G | Refills: 1 | Status: SHIPPED | OUTPATIENT
Start: 2022-12-07

## 2022-12-07 NOTE — LETTER
2022         RE: Jayshree Rodriguez  [address]  [facility] [address]        Dear Colleague,    Thank you for referring your patient, Jayshree Rodriguez, to the [facility] [place]. Please see a copy of my visit note below.    [facility]  SPECIALIZING IN BREAKTHROUGHS  Radiation Oncology    On Treatment Visit Note      Jayshree Rodriguez      Date: 2022   MRN: [id number]   : 1940  Diagnosis: R breast IDC ER/[place]+ HER2-      Reason for Visit:  On Radiation Treatment Visit     Treatment Summary to Date  Treatment Site: R CW + bst, R SC Current Dose: 5056/5256, 4188/4256 cGy Fractions: ,       Chemotherapy  Chemo concurrent with radx?: No    Subjective:     Has multiple questions about her skin reaction today.  Has been using creams as previously directed.      Nursing ROS:   Nutrition Alteration  Diet Type: Patient's Preference  Skin  Skin Reaction: 2 - Moderate to brisk erythema, patchy moist desquamation, mostly confined to skin folds and creases, moderate edema (no desquamation, increased rash to chest and back)  Skin Intervention: Patient to start mometasone cream per Dr. Clark. Continue Aquaphor  Skin Note: Patient has fluid to outer R breast since surgery.        Cardiovascular  Respiratory effort: 1 - Normal - without distress        Psychosocial  Psychosocial Note: Patient denies fatigue  Pain Assessment  0-10 Pain Scale: 0  Pain Note: Itching to chestwall and back      Objective:   /72   Pulse 86   Temp 97.9  F (36.6  C) (Oral)   Resp 16   Wt 55.1 kg (121 lb 8 oz)   SpO2 98%   Gen: Appears well, in no acute distress  Skin: Moderate diffuse erythema over treatment field with small punctate areas of dry desquamation    Labs:  CBC RESULTS: No results for input(s): WBC, RBC, HGB, HCT, MCV, MCH, MCHC, RDW, PLT in the last 53617 hours.  ELECTROLYTES:  No results for input(s): NA, POTASSIUM, CHLORIDE, RAMAN, CO2, BUN, CR, GLC  in the last 55352 hours.    Assessment:    Tolerating radiation therapy well.  All questions and concerns addressed.  Given her skin integrity these side effects are within normal limits.    Toxicities:  Fatigue: Grade 0: No toxicity  Pain: Grade 1: Mild pain  Dermatitis: Grade 2: Moderate to brisk erythema; patchy moist desquamation, mostly confined to skin folds and creases; moderate erythema    Plan:   1. Continue current therapy.    2. Skin care per above.        Mosaiq chart and setup information reviewed  Ports checked    Medication Review  Med list reviewed with patient?: Yes    Educational Topic Discussed  Additional Instructions: Follow up with Kimberlee Barron NP in 1 and 3.5 months, follow up as scheduled with Dr. Avendano, patient to continue taking letrozole per Dr. Avendano.  Education Instructions: Skin cares, fatigue, ROM exercises        Ruddy Clark MD    Please do not send letter to referring physician.          Again, thank you for allowing me to participate in the care of your patient.        Sincerely,        JANINE Clark MD

## 2022-12-08 ENCOUNTER — APPOINTMENT (OUTPATIENT)
Dept: RADIATION ONCOLOGY | Facility: CLINIC | Age: 82
End: 2022-12-08
Payer: MEDICARE

## 2022-12-08 PROCEDURE — 77412 RADIATION TX DELIVERY LVL 3: CPT | Performed by: RADIOLOGY

## 2022-12-09 NOTE — PROGRESS NOTES
[facility]YS[facility]ANS  SPECIALIZING IN BREAKTHROUGHS  Radiation Oncology    On Treatment Visit Note      Jayshree Rodriguez      Date: 2022   MRN: [id number]   : 1940  Diagnosis: R breast IDC ER/[place]+ HER2-      Reason for Visit:  On Radiation Treatment Visit     Treatment Summary to Date  Treatment Site: R CW + bst, R SC Current Dose: 5056/5256, 4188/4256 cGy Fractions: ,       Chemotherapy  Chemo concurrent with radx?: No    Subjective:     Has multiple questions about her skin reaction today.  Has been using creams as previously directed.      Nursing ROS:   Nutrition Alteration  Diet Type: Patient's Preference  Skin  Skin Reaction: 2 - Moderate to brisk erythema, patchy moist desquamation, mostly confined to skin folds and creases, moderate edema (no desquamation, increased rash to chest and back)  Skin Intervention: Patient to start mometasone cream per Dr. Clark. Continue Aquaphor  Skin Note: Patient has fluid to outer R breast since surgery.        Cardiovascular  Respiratory effort: 1 - Normal - without distress        Psychosocial  Psychosocial Note: Patient denies fatigue  Pain Assessment  0-10 Pain Scale: 0  Pain Note: Itching to chestwall and back      Objective:   /72   Pulse 86   Temp 97.9  F (36.6  C) (Oral)   Resp 16   Wt 55.1 kg (121 lb 8 oz)   SpO2 98%   Gen: Appears well, in no acute distress  Skin: Moderate diffuse erythema over treatment field with small punctate areas of dry desquamation    Labs:  CBC RESULTS: No results for input(s): WBC, RBC, HGB, HCT, MCV, MCH, MCHC, RDW, PLT in the last 20980 hours.  ELECTROLYTES:  No results for input(s): NA, POTASSIUM, CHLORIDE, RAMAN, CO2, BUN, CR, GLC in the last 69443 hours.    Assessment:    Tolerating radiation therapy well.  All questions and concerns addressed.  Given her skin integrity these side effects are within normal limits.    Toxicities:  Fatigue: Grade 0: No toxicity  Pain: Grade 1: Mild  pain  Dermatitis: Grade 2: Moderate to brisk erythema; patchy moist desquamation, mostly confined to skin folds and creases; moderate erythema    Plan:   1. Continue current therapy.    2. Skin care per above.        Mosaiq chart and setup information reviewed  Ports checked    Medication Review  Med list reviewed with patient?: Yes    Educational Topic Discussed  Additional Instructions: Follow up with Kimberlee Barron NP in 1 and 3.5 months, follow up as scheduled with Dr. Avendano, patient to continue taking letrozole per Dr. Avendano.  Education Instructions: Skin cares, fatigue, ROM exercises        Ruddy Clark MD    Please do not send letter to referring physician.

## 2022-12-15 ENCOUNTER — DOCUMENTATION ONLY (OUTPATIENT)
Dept: RADIATION ONCOLOGY | Facility: CLINIC | Age: 82
End: 2022-12-15

## 2022-12-15 DIAGNOSIS — Z17.0 MALIGNANT NEOPLASM OF UPPER-OUTER QUADRANT OF RIGHT BREAST IN FEMALE, ESTROGEN RECEPTOR POSITIVE (H): Primary | ICD-10-CM

## 2022-12-15 DIAGNOSIS — C50.411 MALIGNANT NEOPLASM OF UPPER-OUTER QUADRANT OF RIGHT BREAST IN FEMALE, ESTROGEN RECEPTOR POSITIVE (H): Primary | ICD-10-CM

## 2022-12-15 NOTE — PROGRESS NOTES
Radiotherapy Treatment Summary              PATIENT: Jayshree Rodriguez  MEDICAL RECORD NO: [id number]   : 1940    PATHOLOGY/STAGE:  This is a 82 year old woman with right (UOQ) breast cancer, status post mastectomy and SLNBx, revealing G2 IDCA w/ G3 DCIS, [id number] ER+/MN+/H2N- disease     INTENT OF RADIOTHERAPY: Curative    CONCURRENT SYSTEMIC THERAPY:  None           SITE OF TREATMENT:  Right Breast    DATES  OF TREATMENT: 2022-2022    TOTAL DOSE OF TREATMENT: 4,256 cGy to right chest wall and supraclavicular.  5,256 cGy to right chest wall scar boost.    DOSE PER FRACTION OF TREATMENT: 266 cGy for chest wall and supraclavicular and 200 cGy for chest wall scar boost.       COMMENT/TOXICITY:  Grade 2 skin reaction managed with moisturizer and topical steroids. Patient did have mild fatigue relieved by rest.                PAIN MANAGEMENT:  Patient did not require any pain medications.                            FOLLOW UP PLAN:  Patient will follow up with Kimberlee Barron NP in 1 month and in 3.5 months  Patient will continue close follow up with medical oncology.     Ruddy Clark MD  Department of Radiation Oncology  [facility]     CC  Patient Care Team:  No Ref-Primary, Physician as PCP - General  Kimberlee Hoang MD as MD (Surgery)  Meche Clark MD as MD (Radiation Oncology)  Gege Calvin, RN as Registered Nurse  Meche Clark MD as MD (Radiation Oncology)  Meche Clark MD as Assigned Cancer Care Provider

## 2023-01-06 ENCOUNTER — VIRTUAL VISIT (OUTPATIENT)
Dept: RADIATION ONCOLOGY | Facility: CLINIC | Age: 83
End: 2023-01-06
Payer: MEDICARE

## 2023-01-06 DIAGNOSIS — Z17.0 MALIGNANT NEOPLASM OF UPPER-OUTER QUADRANT OF RIGHT BREAST IN FEMALE, ESTROGEN RECEPTOR POSITIVE (H): Primary | ICD-10-CM

## 2023-01-06 DIAGNOSIS — C50.411 MALIGNANT NEOPLASM OF UPPER-OUTER QUADRANT OF RIGHT BREAST IN FEMALE, ESTROGEN RECEPTOR POSITIVE (H): Primary | ICD-10-CM

## 2023-01-06 PROCEDURE — 99024 POSTOP FOLLOW-UP VISIT: CPT | Mod: 95 | Performed by: NURSE PRACTITIONER

## 2023-01-06 NOTE — PROGRESS NOTES
Radiation Oncology Follow-up Visit  2023        Jayshree Rodriguez  MRN: [id number]   : 1940   Video-Visit Details    Type of service:  Video Visit    Joined the call at 2023, 9:28:51 am.  Left the call at 2023, 9:48:33 am.  You were on the call for 19 minutes 41 seconds .    Originating Location (pt. Location): Home      Distant Location (provider location):  Off-site    Mode of Communication:  Video Conference via FRANSISCO Spaulding CNP      DISEASE TREATED:   This is a 82 year old woman with right (UOQ) breast cancer, status post mastectomy and SLNBx, revealing G2 IDCA w/ G3     RADIATION THERAPY DELIVERED:   4256 cGy to right chest wall and supraclavicular and 5256 cGy to right chest wall scar boost completed 22    INTERVAL SINCE COMPLETION OF RADIATION THERAPY:   1 month    SUBJECTIVE:   Jayshree Rodriguez is a 82 year old female who is here today for routine 1 month follow up after completing radiation therapy.  She has seen healing of her skin reaction and continues to moisturize.  She has been doing her stretching and range of motion exercises for chest and shoulder and hasn't noticed any stiffness, so has backed off on those exercises. She does have some swelling that has been present since before radiation on the lower right chest.  She has had this drained a number of times but it just returns.  She is seeing PT for her back, but they have looked at it and no one has told her it is lymphedema.   Fatigue has improved.  She has seen medical oncology and is tolerating letrozole.    ROS:  Complete review of systems is negative except for symptoms discussed in subjective above.    Current Outpatient Medications   Medication     Calcium Carb-Cholecalciferol (CALCIUM/VITAMIN D PO)     fluticasone (FLONASE) 50 MCG/ACT nasal spray     gabapentin (NEURONTIN) 300 MG capsule     hydrocortisone 2.5 % ointment     letrozole (FEMARA) 2.5 MG tablet     mometasone (ELOCON) 0.1  % external cream     polyethylene glycol-propylene glycol (SYSTANE ULTRA) 0.4-0.3 % SOLN ophthalmic solution     psyllium (METAMUCIL/KONSYL) 0.52 g capsule     rosuvastatin (CRESTOR) 5 MG tablet     No current facility-administered medications for this visit.          Allergies   Allergen Reactions     Amoxicillin Diarrhea and GI Disturbance     ##No similarities in side chains, very low to no risk of cross-sensitivity to ANCEF. [facility] Antimicrobial Stewardship Team 10/2019         Past Medical History:   Diagnosis Date     Advance care planning 6/7/2022     Basal cell carcinoma (BCC) 6/1/2018    Formatting of this note might be different from the original. Ears, face     Breast cancer (H) 10/25/2022     DDD (degenerative disc disease), lumbar 10/14/2019     Hip bursitis, left 9/12/2013     Hyperlipidemia 6/29/2017     Impaired cognition 6/7/2022     Lumbosacral radiculitis 10/25/2022     Malignant neoplasm of upper-outer quadrant of right breast in female, estrogen receptor positive (H) 8/30/2022     Rosacea 9/12/2013     Tinnitus 9/12/2013     Ulcerative colitis (H) 9/12/2013    Formatting of this note might be different from the original. Follows with gastroenterology at [facility].  Also sees Dr. Storm     Vitreous detachment 9/12/2013         PHYSICAL EXAM:  There were no vitals taken for this visit.  Gen: Alert, in NAD  Psychiatric: Appropriate mood and affect      LABS AND IMAGING:  Reviewed.    IMPRESSION:   Ms. Rodriguez is a 82 year old female with a right breast cancer s/p 1 month out from radiation and doing well.    PLAN:   Patient has recovered nicely from acute side effects of radiation therapy.  Discussed long term care with continued moisturizing of the treated breast, stretching and range of motion exercises, and sun screen.  Patient will follow up with medical oncology for continued care and imaging.  She will follow up here in 2.5 months.  Discussed to come in or call with any concerns or questions that may  develop.      Kimberlee Barron NP  Radiation Oncology  [facility]

## 2023-03-21 ENCOUNTER — VIRTUAL VISIT (OUTPATIENT)
Dept: RADIATION ONCOLOGY | Facility: CLINIC | Age: 83
End: 2023-03-21
Payer: MEDICARE

## 2023-03-21 DIAGNOSIS — C50.411 MALIGNANT NEOPLASM OF UPPER-OUTER QUADRANT OF RIGHT BREAST IN FEMALE, ESTROGEN RECEPTOR POSITIVE (H): Primary | ICD-10-CM

## 2023-03-21 DIAGNOSIS — Z17.0 MALIGNANT NEOPLASM OF UPPER-OUTER QUADRANT OF RIGHT BREAST IN FEMALE, ESTROGEN RECEPTOR POSITIVE (H): Primary | ICD-10-CM

## 2023-03-21 PROCEDURE — 99213 OFFICE O/P EST LOW 20 MIN: CPT | Mod: VID | Performed by: NURSE PRACTITIONER

## 2023-03-21 NOTE — PROGRESS NOTES
Radiation Oncology Follow-up Visit  2023          Jayshree Rodriguez  MRN: [id number]   : 1940   Video-Visit Details    Type of service:  Video Visit    Joined the call at 3/21/2023, 3:29:11 pm.  Left the call at 3/21/2023, 3:44:47 pm.    Originating Location (pt. Location): Home      Distant Location (provider location):  On site    Mode of Communication:  Video Conference via FRANSISCO Spaulding CNP      DISEASE TREATED:   This is a 82 year old woman with right (UOQ) breast cancer, status post mastectomy and SLNBx, revealing G2 IDCA w/ G3     RADIATION THERAPY DELIVERED:   4256 cGy to right chest wall and supraclavicular and 5256 cGy to right chest wall scar boost completed 22    INTERVAL SINCE COMPLETION OF RADIATION THERAPY:   1 month    SUBJECTIVE:   Jayshree Rodriguez is a 82 year old female who is here today for routine 1 month follow up after completing radiation therapy.  She has seen healing of her skin reaction and continues to moisturize.  She has been doing her stretching and range of motion exercises for chest and shoulder and hasn't noticed any stiffness, so has backed off on those exercises. She does have some swelling that has been present since before radiation on the lower right chest.  She has had this drained a number of times but it just returns. It does seem to be improving. She is seeing PT for her back, but they have looked at it and no one has told her it is lymphedema.   Fatigue has improved.  She has seen medical oncology and is tolerating letrozole.    ROS:  Complete review of systems is negative except for symptoms discussed in subjective above.    Current Outpatient Medications   Medication     Calcium Carb-Cholecalciferol (CALCIUM/VITAMIN D PO)     fluticasone (FLONASE) 50 MCG/ACT nasal spray     gabapentin (NEURONTIN) 300 MG capsule     hydrocortisone 2.5 % ointment     letrozole (FEMARA) 2.5 MG tablet     mometasone (ELOCON) 0.1 % external cream      polyethylene glycol-propylene glycol (SYSTANE ULTRA) 0.4-0.3 % SOLN ophthalmic solution     psyllium (METAMUCIL/KONSYL) 0.52 g capsule     rosuvastatin (CRESTOR) 5 MG tablet     No current facility-administered medications for this visit.          Allergies   Allergen Reactions     Amoxicillin Diarrhea and GI Disturbance     ##No similarities in side chains, very low to no risk of cross-sensitivity to ANCEF. [facility] Antimicrobial Stewardship Team 10/2019         Past Medical History:   Diagnosis Date     Advance care planning 6/7/2022     Basal cell carcinoma (BCC) 6/1/2018    Formatting of this note might be different from the original. Ears, face     Breast cancer (H) 10/25/2022     DDD (degenerative disc disease), lumbar 10/14/2019     Hip bursitis, left 9/12/2013     Hyperlipidemia 6/29/2017     Impaired cognition 6/7/2022     Lumbosacral radiculitis 10/25/2022     Malignant neoplasm of upper-outer quadrant of right breast in female, estrogen receptor positive (H) 8/30/2022     Rosacea 9/12/2013     Tinnitus 9/12/2013     Ulcerative colitis (H) 9/12/2013    Formatting of this note might be different from the original. Follows with gastroenterology at [facility].  Also sees Dr. Storm     Vitreous detachment 9/12/2013         PHYSICAL EXAM:  There were no vitals taken for this visit.  Gen: Alert, in NAD  Psychiatric: Appropriate mood and affect      LABS AND IMAGING:  Reviewed.    IMPRESSION:   Ms. Rodriguez is a 82 year old female with a right breast cancer s/p 3.5 month out from radiation and doing well.    PLAN:   Patient has recovered nicely from acute side effects of radiation therapy.  Discussed long term care with continued moisturizing of the treated breast, stretching and range of motion exercises, and sun screen.  Patient will follow up with medical oncology for continued care and imaging.   Discussed to come in or call with any concerns or questions that may develop.      Kimberlee Barron NP  Radiation  Oncology  [facility]

## 2024-02-03 ENCOUNTER — HEALTH MAINTENANCE LETTER (OUTPATIENT)
Age: 84
End: 2024-02-03

## 2024-04-07 ENCOUNTER — HEALTH MAINTENANCE LETTER (OUTPATIENT)
Age: 84
End: 2024-04-07

## 2025-03-02 ENCOUNTER — HEALTH MAINTENANCE LETTER (OUTPATIENT)
Age: 85
End: 2025-03-02

## 2025-04-19 ENCOUNTER — HEALTH MAINTENANCE LETTER (OUTPATIENT)
Age: 85
End: 2025-04-19